# Patient Record
Sex: FEMALE | URBAN - METROPOLITAN AREA
[De-identification: names, ages, dates, MRNs, and addresses within clinical notes are randomized per-mention and may not be internally consistent; named-entity substitution may affect disease eponyms.]

---

## 2021-03-03 ENCOUNTER — APPOINTMENT (OUTPATIENT)
Age: 26
Setting detail: DERMATOLOGY
End: 2021-03-04

## 2021-03-03 VITALS — TEMPERATURE: 98.5 F

## 2021-03-03 DIAGNOSIS — Z41.9 ENCOUNTER FOR PROCEDURE FOR PURPOSES OTHER THAN REMEDYING HEALTH STATE, UNSPECIFIED: ICD-10-CM

## 2021-03-03 PROCEDURE — OTHER MIPS QUALITY: OTHER

## 2021-03-03 PROCEDURE — OTHER COSMETIC CONSULTATION: BOTOX: OTHER

## 2021-03-03 PROCEDURE — OTHER OTHER: OTHER

## 2021-03-03 NOTE — PROCEDURE: OTHER
Other (Free Text): Quoted 247.00 for 19 units of Botox, samples of plexion cleanser
Note Text (......Xxx Chief Complaint.): This diagnosis correlates with the
Detail Level: Zone
Render Risk Assessment In Note?: no